# Patient Record
(demographics unavailable — no encounter records)

---

## 2024-10-22 NOTE — ASSESSMENT
[FreeTextEntry1] : RADHA is a 5 month old boy presenting for tongue tie.  Education provided: - Many tongue-ties are asymptomatic and cause no problems. Some babies with tongue-tie have breastfeeding difficulties. Conservative management includes breastfeeding advice, and careful assessment is important to determine whether the frenulum is interfering with feeding and whether its division is appropriate. - Breastfeeding is a complex interaction between mother and child and many factors can affect the ability to feed. Skilled breastfeeding support is an integral part of the management of breastfeeding difficulties. - Tongue-tie usually doesnt keep babies from learning to speak. Your child may just have trouble making certain sounds, such as t, d, z, s, th, n, and l. In rare cases, a child with tongue-tie may have other problems, like cleft lip or cleft palate. These can cause other symptoms. - In later years, tongue-tie can make it hard for your child to do some activities, such as lick an ice cream cone, play a wind instrument, or kiss. It may cause embarrassment or social problems for some children.  *** Parents elected to proceed with frenulectomy, see procedure note for full details.  Patient tolerated the procedure well and can follow up as need.

## 2024-10-22 NOTE — REASON FOR VISIT
[Initial Evaluation] : an initial evaluation for [Parents] : parents [Other: ______] : provided by JEANETTE [FreeTextEntry2] : tongue tie [Interpreters_IDNumber] : [id number] [Interpreters_FullName] : Jatin [TWNoteComboBox1] : Chinese

## 2024-10-22 NOTE — HISTORY OF PRESENT ILLNESS
[No Personal or Family History of Easy Bruising, Bleeding, or Issues with General Anesthesia] : No Personal or Family History of easy bruising, bleeding, or issues with general anesthesia [de-identified] : Today I had the pleasure of seeing RADHA SAHA for new patient evaluation.  RADHA is a 5 month old boy who presents for: tongue tie History was obtained from patient, parents and chart.  Referred by pediatrician  Concerns for tongue tie  Currently bottle fed, no issues with feeding  Mother did not wish to breastfeed Gaining appropriate weight  No choking with feedings No chronic nasal congestion, just had a cold and had some nasal congestion  No snoring  No recent ear infections  No concerns for his hearing  Passed [facility]

## 2024-10-22 NOTE — PHYSICAL EXAM
[Normal Gait and Station] : normal gait and station [Normal muscle strength, symmetry and tone of facial, head and neck musculature] : normal muscle strength, symmetry and tone of facial, head and neck musculature [Normal] : no cervical lymphadenopathy [Increased Work of Breathing] : no increased work of breathing with use of accessory muscles and retractions [de-identified] : significant ankyloglossia, palate intact

## 2024-10-22 NOTE — CONSULT LETTER
[Dear  ___] : Dear  [unfilled], [Courtesy Letter:] : I had the pleasure of seeing your patient, [unfilled], in my office today. [Please see my note below.] : Please see my note below. [Consult Closing:] : Thank you very much for allowing me to participate in the care of this patient.  If you have any questions, please do not hesitate to contact me. [Sincerely,] : Sincerely, [FreeTextEntry2] : Dr. Mike Carolina [address], [place] [address]  [phone] [FreeTextEntry3] : Alejandrina Mijares MD Pediatric Otolaryngology / Head and Neck Surgery  [facility] [address] [place] [address] Tel [phone] Fax [phone]  [address] [facility], [facility] [address] [place] [address]  Tel [phone] [phone]